# Patient Record
Sex: MALE | Race: BLACK OR AFRICAN AMERICAN | NOT HISPANIC OR LATINO | Employment: UNEMPLOYED | URBAN - METROPOLITAN AREA
[De-identification: names, ages, dates, MRNs, and addresses within clinical notes are randomized per-mention and may not be internally consistent; named-entity substitution may affect disease eponyms.]

---

## 2019-03-02 ENCOUNTER — HOSPITAL ENCOUNTER (EMERGENCY)
Facility: MEDICAL CENTER | Age: 49
End: 2019-03-02
Attending: EMERGENCY MEDICINE
Payer: COMMERCIAL

## 2019-03-02 VITALS
RESPIRATION RATE: 16 BRPM | HEART RATE: 81 BPM | HEIGHT: 68 IN | DIASTOLIC BLOOD PRESSURE: 74 MMHG | SYSTOLIC BLOOD PRESSURE: 115 MMHG | TEMPERATURE: 97.4 F | OXYGEN SATURATION: 100 % | BODY MASS INDEX: 22.55 KG/M2 | WEIGHT: 148.81 LBS

## 2019-03-02 DIAGNOSIS — I10 HYPERTENSION, UNSPECIFIED TYPE: ICD-10-CM

## 2019-03-02 DIAGNOSIS — R51.9 CHRONIC NONINTRACTABLE HEADACHE, UNSPECIFIED HEADACHE TYPE: ICD-10-CM

## 2019-03-02 DIAGNOSIS — Z76.0 MEDICATION REFILL: ICD-10-CM

## 2019-03-02 DIAGNOSIS — G89.29 CHRONIC NONINTRACTABLE HEADACHE, UNSPECIFIED HEADACHE TYPE: ICD-10-CM

## 2019-03-02 PROCEDURE — 99284 EMERGENCY DEPT VISIT MOD MDM: CPT

## 2019-03-02 PROCEDURE — 96372 THER/PROPH/DIAG INJ SC/IM: CPT

## 2019-03-02 PROCEDURE — A9270 NON-COVERED ITEM OR SERVICE: HCPCS | Performed by: EMERGENCY MEDICINE

## 2019-03-02 PROCEDURE — 700102 HCHG RX REV CODE 250 W/ 637 OVERRIDE(OP): Performed by: EMERGENCY MEDICINE

## 2019-03-02 PROCEDURE — 700111 HCHG RX REV CODE 636 W/ 250 OVERRIDE (IP)

## 2019-03-02 PROCEDURE — 700111 HCHG RX REV CODE 636 W/ 250 OVERRIDE (IP): Performed by: EMERGENCY MEDICINE

## 2019-03-02 RX ORDER — DIPHENHYDRAMINE HYDROCHLORIDE 50 MG/ML
INJECTION INTRAMUSCULAR; INTRAVENOUS
Status: COMPLETED
Start: 2019-03-02 | End: 2019-03-02

## 2019-03-02 RX ORDER — AMLODIPINE BESYLATE 5 MG/1
5 TABLET ORAL DAILY
COMMUNITY
End: 2019-03-02

## 2019-03-02 RX ORDER — METOCLOPRAMIDE HYDROCHLORIDE 5 MG/ML
10 INJECTION INTRAMUSCULAR; INTRAVENOUS ONCE
Status: COMPLETED | OUTPATIENT
Start: 2019-03-02 | End: 2019-03-02

## 2019-03-02 RX ORDER — DIPHENHYDRAMINE HYDROCHLORIDE 50 MG/ML
50 INJECTION INTRAMUSCULAR; INTRAVENOUS ONCE
Status: COMPLETED | OUTPATIENT
Start: 2019-03-02 | End: 2019-03-02

## 2019-03-02 RX ORDER — AMLODIPINE BESYLATE 5 MG/1
5 TABLET ORAL DAILY
Qty: 30 TAB | Refills: 0 | Status: SHIPPED | OUTPATIENT
Start: 2019-03-02 | End: 2019-04-01

## 2019-03-02 RX ORDER — IBUPROFEN 200 MG
400 TABLET ORAL ONCE
Status: COMPLETED | OUTPATIENT
Start: 2019-03-02 | End: 2019-03-02

## 2019-03-02 RX ADMIN — DIPHENHYDRAMINE HYDROCHLORIDE 50 MG: 50 INJECTION INTRAMUSCULAR; INTRAVENOUS at 18:42

## 2019-03-02 RX ADMIN — METOCLOPRAMIDE 10 MG: 5 INJECTION, SOLUTION INTRAMUSCULAR; INTRAVENOUS at 18:42

## 2019-03-02 RX ADMIN — IBUPROFEN 400 MG: 200 TABLET, FILM COATED ORAL at 18:41

## 2019-03-02 ASSESSMENT — ENCOUNTER SYMPTOMS
SEIZURES: 0
VOMITING: 0
BACK PAIN: 0
SENSORY CHANGE: 0
TREMORS: 0
NAUSEA: 0
COUGH: 0
FEVER: 0
NECK PAIN: 0
EYE PAIN: 0
DIZZINESS: 0
SPEECH CHANGE: 0
ABDOMINAL PAIN: 0
SORE THROAT: 0
CHILLS: 0
FOCAL WEAKNESS: 0
HEADACHES: 1
LOSS OF CONSCIOUSNESS: 0
EYE REDNESS: 0
SHORTNESS OF BREATH: 0
SINUS PAIN: 0

## 2019-03-02 ASSESSMENT — LIFESTYLE VARIABLES: SUBSTANCE_ABUSE: 0

## 2019-03-03 NOTE — ED NOTES
The patient has been provided with discharge education and information.  The patient was also provided with instructions on follow up care and return precautions.  The patient verbalizes understanding of discharge instructions, follow up care, and return precautons.  All questions have been answered.  Amlodipine RX written by Eliana SHEA, good color and appropriate at time of discharge.  Patient ambulated out of department.

## 2019-03-03 NOTE — ED PROVIDER NOTES
ED Provider Note   3/2/2019  6:23 PM    Means of Arrival: Walk In  History obtained by: patient  Limitations: none    CHIEF COMPLAINT  Chief Complaint   Patient presents with   • Headache     Started a month ago   • Medication Refill     Patient has been out of his amlodipine for the last month.        HPI  Dorian Aden is a 49 y.o. male with history of hypertension presenting with concerns of a frontal headache for 1 month.  He says that approximately 1 month ago he ran out of the pain.  Around that time he has noticed a dull sometimes throbbing headache right front part of his head.  He takes aspirin which provides temporary relief.  No visual changes.  No weakness.  No neurologic symptoms besides headache.  He has had no fevers.    REVIEW OF SYSTEMS  Review of Systems   Constitutional: Negative for chills, fever and malaise/fatigue.   HENT: Negative for congestion, sinus pain and sore throat.    Eyes: Negative for pain and redness.   Respiratory: Negative for cough and shortness of breath.    Gastrointestinal: Negative for abdominal pain, nausea and vomiting.   Musculoskeletal: Negative for back pain and neck pain.   Neurological: Positive for headaches. Negative for dizziness, tremors, sensory change, speech change, focal weakness, seizures and loss of consciousness.   Psychiatric/Behavioral: Negative for substance abuse.   All other systems reviewed and are negative.    See HPI for further details.     PAST MEDICAL HISTORY   has a past medical history of Hypertension.    SOCIAL HISTORY  Social History     Social History Main Topics   • Smoking status: Never Smoker   • Smokeless tobacco: Never Used   • Alcohol use Yes      Comment: occ   • Drug use: No   • Sexual activity: Not on file       SURGICAL HISTORY  patient denies any surgical history    CURRENT MEDICATIONS  Home Medications     Reviewed by Sugey Bustos R.N. (Registered Nurse) on 03/02/19 at 1755  Med List Status: Complete   Medication Last  "Dose Status   amLODIPine (NORVASC) 5 MG Tab >than a month Active                ALLERGIES  No Known Allergies    PHYSICAL EXAM  VITAL SIGNS: /74   Pulse 81   Temp 36.3 °C (97.4 °F) (Temporal)   Resp 16   Ht 1.727 m (5' 8\")   Wt 67.5 kg (148 lb 13 oz)   SpO2 100%   BMI 22.63 kg/m²    Pulse ox interpretation: I interpret this pulse ox as normal.  Constitutional: Alert in no apparent distress.  HENT: Normocephalic, Atraumatic, Bilateral external ears normal. Nose normal.   Eyes: Pupils are equal. Conjunctiva normal, non-icteric.   Heart: Regular rate and rhythm  Lungs: No respiratory distress, regular respirations.   Abdomen: Normal appearance, nondistended,  Skin: Warm, Dry, No erythema, No rash.   Neurologic: Alert, Grossly non-focal. No slurred speech.  No facial droop.  Normal extraocular movements.  Negative Romberg.  Normal finger to nose testing.  Steady gait.  5 out of 5 strength in all extremities.  MSK: Full range of motion of the neck without pain or exacerbation of headache.  Psychiatric: Affect normal, Judgment normal, Mood normal, Appears appropriate and not intoxicated.   Physical Exam      COURSE & MEDICAL DECISION MAKING  Pertinent Labs & Imaging studies reviewed. (See chart for details)    6:23 PM This is an emergent evaluation of a 49 y.o., male with history of hypertension presenting with concerns of a chronic frontal headache.  Temporary relieved with aspirin.  Currently out of amlodipine.  Blood pressure here is 146/88.  Vitals otherwise normal.  Definitely not hypertensive emergency.  He is not showing any signs of altered mental status, no chest pain, no dyspnea, and no neurologic deficits.  Also low suspicion for intracranial hemorrhage given headache coming and going for the past few weeks.  Also unlikely mass lesion given no neurologic changes.  Cannot rule out aneurysm disease but very low suspicion for rupture.  I discussed possibly getting imaging however no emergent " indications at this time given chronicity of symptoms and normal physical exam.  Plan will be to acutely treat the headache here with Motrin, Reglan, Benadryl.  Also refill amlodipine prescription.  He has wife tell me they are working on finding a primary doctor under their insurance.     7:42   Headache is now resolved.  He wishes to go home.  I have provided him with prescription for amlodipine.      The patient will return for worsening symptoms and is stable at the time of discharge. The patient verbalizes understanding.    FINAL IMPRESSION  1. Hypertension, unspecified type    2. Medication refill    3. Chronic nonintractable headache, unspecified headache type               Electronically signed by: Vance Colon II, 3/2/2019 6:23 PM

## 2019-03-03 NOTE — ED TRIAGE NOTES
Chief Complaint   Patient presents with   • Headache     Started a month ago   • Medication Refill     Patient has been out of his amlodipine for the last month.        Patient ambulatory to triage room with family. Patient states he has been out of his amlodipine for the last month and since then he has been having headache. No complaint of chest pain.     Patient placed out in lobby with family and educated on triage process.

## 2024-03-27 ENCOUNTER — APPOINTMENT (OUTPATIENT)
Dept: URGENT CARE | Facility: CLINIC | Age: 54
End: 2024-03-27
Payer: COMMERCIAL

## 2024-03-27 ENCOUNTER — HOSPITAL ENCOUNTER (EMERGENCY)
Facility: MEDICAL CENTER | Age: 54
End: 2024-03-27
Attending: EMERGENCY MEDICINE
Payer: COMMERCIAL

## 2024-03-27 VITALS
SYSTOLIC BLOOD PRESSURE: 181 MMHG | OXYGEN SATURATION: 97 % | TEMPERATURE: 98.1 F | HEART RATE: 88 BPM | HEIGHT: 64 IN | DIASTOLIC BLOOD PRESSURE: 132 MMHG | RESPIRATION RATE: 17 BRPM | WEIGHT: 155.87 LBS | BODY MASS INDEX: 26.61 KG/M2

## 2024-03-27 DIAGNOSIS — R11.2 NAUSEA VOMITING AND DIARRHEA: ICD-10-CM

## 2024-03-27 DIAGNOSIS — R19.7 NAUSEA VOMITING AND DIARRHEA: ICD-10-CM

## 2024-03-27 PROCEDURE — 99283 EMERGENCY DEPT VISIT LOW MDM: CPT

## 2024-03-27 PROCEDURE — 700111 HCHG RX REV CODE 636 W/ 250 OVERRIDE (IP): Performed by: EMERGENCY MEDICINE

## 2024-03-27 RX ORDER — ONDANSETRON 4 MG/1
4 TABLET, ORALLY DISINTEGRATING ORAL EVERY 8 HOURS PRN
Qty: 5 TABLET | Refills: 0 | Status: SHIPPED | OUTPATIENT
Start: 2024-03-27

## 2024-03-27 RX ORDER — ONDANSETRON 4 MG/1
4 TABLET, ORALLY DISINTEGRATING ORAL ONCE
Status: COMPLETED | OUTPATIENT
Start: 2024-03-27 | End: 2024-03-27

## 2024-03-27 RX ADMIN — ONDANSETRON 4 MG: 4 TABLET, ORALLY DISINTEGRATING ORAL at 10:31

## 2024-03-27 NOTE — ED TRIAGE NOTES
"Chief Complaint   Patient presents with    Flu Like Symptoms     X 2 days. N/V/D, sore throat.         BP (!) 175/124   Pulse 90   Temp 36.8 °C (98.3 °F) (Oral)   Resp 18   Ht 1.626 m (5' 4\")   SpO2 98%   BMI 25.54 kg/m²    "

## 2024-03-27 NOTE — ED PROVIDER NOTES
"  ER Provider Note    Scribed for Jack Orellana M.D. by Darrian Bean. 3/27/2024   10:16 AM    Primary Care Provider: No primary care provider noted    CHIEF COMPLAINT  Chief Complaint   Patient presents with    Flu Like Symptoms     X 2 days. N/V/D, sore throat.      EXTERNAL RECORDS REVIEWED  Outpatient Notes shows that the patient was last seen in the ED in 2019 for hypertension     HPI/ROS  LIMITATION TO HISTORY   Select: : None  OUTSIDE HISTORIAN(S):  None    Dorian Aden is a 54 y.o. male who presents to the ED for evaluation of an acute sore throat onset two days ago. Patient reports that a lot of his coworkers are sick with similar symptoms. He reports that since onset, he has been lying in bed as he has been \"sick to his stomach.\" Today was the first day he was able to get out of bed, prompting his visit today. Currently in the ED, he reports feeling improved as his symptoms have resolved, and that he believes he is ready to return to work tomorrow. Patient had associated nausea, vomiting, diarrhea, abdominal pain, and headache. Denies any fevers. No alleviating or exacerbating factors reported. History of hypertension. No known drug allergies.     PAST MEDICAL HISTORY  Past Medical History:   Diagnosis Date    Hypertension        SURGICAL HISTORY  History reviewed. No pertinent surgical history.    FAMILY HISTORY  History reviewed. No pertinent family history.    SOCIAL HISTORY   reports that he has never smoked. He has never used smokeless tobacco. He reports that he does not currently use alcohol. He reports that he does not use drugs.    CURRENT MEDICATIONS  None noted    ALLERGIES  No Known Allergies     PHYSICAL EXAM  BP (!) 175/124   Pulse 90   Temp 36.8 °C (98.3 °F) (Oral)   Resp 18   Ht 1.626 m (5' 4\")   Wt 70.7 kg (155 lb 13.8 oz)   SpO2 98%   BMI 26.75 kg/m²      Nursing note and vitals reviewed.  Constitutional: Well-developed and well-nourished. No distress.   HENT: Head is " normocephalic and atraumatic. Oropharynx is clear and moist without exudate or erythema.   Eyes: Pupils are equal, round, and reactive to light. Conjunctiva are normal.   Cardiovascular: Normal rate and regular rhythm. No murmur heard. Normal radial pulses.  Pulmonary/Chest: Breath sounds normal. No wheezes or rales.   Abdominal: Soft. Unable to elicit any abdominal tenderness. No distention    Musculoskeletal: Extremities exhibit normal range of motion without edema or tenderness.   Neurological: Awake, alert and oriented to person, place, and time. No focal deficits noted.  Skin: Skin is warm and dry. No rash.   Psychiatric: Normal mood and affect. Appropriate for clinical situation      INITIAL ASSESSMENT AND PLAN    10:16 AM - Patient was seen and evaluated at bedside. Patient presents to the ED for flu like symptoms. After my exam, I discussed with the patient the plan of care, which includes treating the patient with medication for their symptoms. He states that he feels well enough to go back to work tomorrow as his symptoms have resolved, so a work note will be provided. Patient understands and verbalizes agreement to plan of care. They had the opportunity to ask questions. No further questions or concerns at this time. I then informed the patient of my plan for discharge, which includes strict return precautions for any new or worsening symptoms. Patient understands and verbalizes agreement to plan of care. Patient is comfortable going home at this time.      ED Observation Status? No; Patient does not meet criteria for ED Observation.        COURSE AND MEDICAL DECISION MAKING    The patient presents today with nausea, vomiting, and diarrhea the patient has a benign abdominal exam. There is no tenderness to make me concerned for more serious intra-abdominal pathology. The patient was treated with Zofran for nausea. On reassessment the patient was feeling better. The patient continued to have a nonsurgical  abdomen. Overall the patient is improved and will be discharged home with a prescription of Zofran. I feel that this patient is a good outpatient treatment candidate. I recommended that the patient return to the emergency department should they have any abdominal discomfort does not resolve within 24 hours.    HTN/IDDM FOLLOW UP:  The patient has known hypertension and is being followed by their primary care doctor      DISPOSITION AND DISCUSSIONS    I have discussed management of the patient with the following physicians and JIA's:  None    Discussion of management with other QHP or appropriate source(s): None     Escalation of care considered, and ultimately not performed: blood analysis and diagnostic imaging.    Barriers to care at this time, including but not limited to: Patient does not have established PCP.     Decision tools and prescription drugs considered including, but not limited to:  Zofran to help his nausea .    Patient will be discharged home.    FOLLOW UP:  [facility], Emergency Dept  [facility] [facility] [address] [phone]    If symptoms worsen      OUTPATIENT MEDICATIONS:  New Prescriptions    ONDANSETRON (ZOFRAN ODT) 4 MG TABLET DISPERSIBLE    Take 1 Tablet by mouth every 8 hours as needed for Nausea/Vomiting.        FINAL DIAGNOSIS  1. Nausea vomiting and diarrhea         Darrian BORGES (Whitney), am scribing for, and in the presence of, Jack Orellana M.D..    Electronically signed by: Darrian Bean (Whitney), 3/27/2024    Jack BORGES M.D. personally performed the services described in this documentation, as scribed by Darrian Bean in my presence, and it is both accurate and complete.      The note accurately reflects work and decisions made by me.  Jack Orellana M.D.  3/27/2024  4:16 PM

## 2024-04-08 ENCOUNTER — HOSPITAL ENCOUNTER (EMERGENCY)
Facility: MEDICAL CENTER | Age: 54
End: 2024-04-08
Attending: EMERGENCY MEDICINE
Payer: COMMERCIAL

## 2024-04-08 VITALS
SYSTOLIC BLOOD PRESSURE: 160 MMHG | DIASTOLIC BLOOD PRESSURE: 100 MMHG | HEIGHT: 66 IN | RESPIRATION RATE: 16 BRPM | BODY MASS INDEX: 25.3 KG/M2 | OXYGEN SATURATION: 95 % | WEIGHT: 157.41 LBS | HEART RATE: 80 BPM | TEMPERATURE: 96.7 F

## 2024-04-08 DIAGNOSIS — R03.0 ELEVATED BLOOD PRESSURE READING: ICD-10-CM

## 2024-04-08 DIAGNOSIS — H91.90 DECREASED HEARING, UNSPECIFIED LATERALITY: ICD-10-CM

## 2024-04-08 DIAGNOSIS — Z78.9 ALCOHOL USE: ICD-10-CM

## 2024-04-08 PROCEDURE — 99281 EMR DPT VST MAYX REQ PHY/QHP: CPT

## 2024-04-08 NOTE — DISCHARGE INSTRUCTIONS
Please see an audiologist to have your hearing measured which will help determine what kind of hearing loss you have.  This may be due to chronic noise exposure.  You should also see an ear nose throat doctor who will evaluate you in the same office as the audiologist usually.    There is some wax in your ears today but not wax that is blocking the passage of noise to your eardrums.

## 2024-04-08 NOTE — ED TRIAGE NOTES
Chief Complaint   Patient presents with    Hearing Loss     Right sided hearing loss x1 year. Pt states it is just getting worse and worse. Reports ear wax build up for months.       Patient ambulatory to triage for above complaint. Patient A&Ox4, GCS 15, patient speaking in full sentences. Equal and unlabored respirations. Patient educated on triage process and encouraged to notify staff if condition worsens. Appropriate protocols ordered. Patient returned to the lobby in stable condition.

## 2024-04-08 NOTE — ED PROVIDER NOTES
"ED Provider Note    CHIEF COMPLAINT  Chief Complaint   Patient presents with    Hearing Loss     Right sided hearing loss x1 year. Pt states it is just getting worse and worse. Reports ear wax build up for months.        EXTERNAL RECORDS REVIEWED  Other none    HPI/ROS  LIMITATION TO HISTORY   Select: : None    OUTSIDE HISTORIAN(S):  none    Dorian Aden is a 54 y.o. male with history of hypertension who presents for hearing loss.    Patient states he is becoming frustrated and others are becoming frustrated with his hearing loss.  He often asks people to repeat themselves, especially when there are other noises in the area.  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    Patient denies trauma, pain, tinnitus.    Patient has had cerumen impaction in the past.    PAST MEDICAL HISTORY   has a past medical history of Hypertension.    SURGICAL HISTORY  patient denies any surgical history    FAMILY HISTORY  History reviewed. No pertinent family history.    SOCIAL HISTORY  Social History     Tobacco Use    Smoking status: Never    Smokeless tobacco: Never   Substance and Sexual Activity    Alcohol use: Not Currently     Comment: occ    Drug use: No    Sexual activity: Not on file       CURRENT MEDICATIONS  Home Medications       Reviewed by Olivia Cruz R.N. (Registered Nurse) on 04/08/24 at 0953  Med List Status: Partial     Medication Last Dose Status   ondansetron (ZOFRAN ODT) 4 MG TABLET DISPERSIBLE  Active                    ALLERGIES  No Known Allergies    PHYSICAL EXAM  VITAL SIGNS: BP (!) 160/100   Pulse 80   Temp 35.9 °C (96.7 °F) (Temporal)   Resp 16   Ht 1.676 m (5' 6\")   Wt 71.4 kg (157 lb 6.5 oz)   SpO2 95%   BMI 25.41 kg/m²    General:  WDWN male, nontoxic appearing in NAD; A+Ox3; V/S as above; elevated blood pressure  Skin: warm and dry; good color; no rash  HEENT: NCAT; EOMs intact; PERRL; no scleral icterus   Neck: FROM; TMs are partially obscured by cerumen " bilaterally; no obvious air-fluid level or erythema noted  Extremities: AHMADI x 4  Neurologic: CNs III-XII grossly intact; speech clear  Psychiatric: Appropriate affect, normal mood      EKG/LABS  None    RADIOLOGY  None    COURSE & MEDICAL DECISION MAKING    ASSESSMENT, COURSE AND PLAN  Care Narrative: This is a 54-year-old who presents for bilateral hearing loss that began more than 1 year ago.  There is no evidence of acute cerumen impaction that be causing his hearing loss.  I suspect this is due to chronic noise exposure.  I have advised him to follow-up with an audiologist and ENT.  A referral was placed for ENT and a PCP.  He was given return precautions.      FINAL DIAGNOSIS  1. Decreased hearing, unspecified laterality    2. Elevated blood pressure reading    3. Alcohol use        Electronically signed by: Ly De M.D., 4/8/2024 10:06 AM

## 2024-05-03 ENCOUNTER — OFFICE VISIT (OUTPATIENT)
Dept: MEDICAL GROUP | Age: 54
End: 2024-05-03
Attending: EMERGENCY MEDICINE
Payer: COMMERCIAL

## 2024-05-03 VITALS
WEIGHT: 160 LBS | RESPIRATION RATE: 16 BRPM | OXYGEN SATURATION: 99 % | BODY MASS INDEX: 25.71 KG/M2 | HEART RATE: 86 BPM | SYSTOLIC BLOOD PRESSURE: 148 MMHG | HEIGHT: 66 IN | TEMPERATURE: 97 F | DIASTOLIC BLOOD PRESSURE: 100 MMHG

## 2024-05-03 DIAGNOSIS — Z23 NEED FOR VACCINATION: ICD-10-CM

## 2024-05-03 DIAGNOSIS — Z12.12 SCREENING FOR COLORECTAL CANCER: ICD-10-CM

## 2024-05-03 DIAGNOSIS — H61.23 BILATERAL IMPACTED CERUMEN: ICD-10-CM

## 2024-05-03 DIAGNOSIS — Z76.89 ESTABLISHING CARE WITH NEW DOCTOR, ENCOUNTER FOR: ICD-10-CM

## 2024-05-03 DIAGNOSIS — Z12.5 SCREENING FOR PROSTATE CANCER: ICD-10-CM

## 2024-05-03 DIAGNOSIS — Z11.59 NEED FOR HEPATITIS C SCREENING TEST: ICD-10-CM

## 2024-05-03 DIAGNOSIS — Z00.00 BLOOD TESTS FOR ROUTINE GENERAL PHYSICAL EXAMINATION: ICD-10-CM

## 2024-05-03 DIAGNOSIS — Z11.4 SCREENING FOR HIV (HUMAN IMMUNODEFICIENCY VIRUS): ICD-10-CM

## 2024-05-03 DIAGNOSIS — Z12.11 SCREENING FOR COLORECTAL CANCER: ICD-10-CM

## 2024-05-03 DIAGNOSIS — I10 ESSENTIAL HYPERTENSION: ICD-10-CM

## 2024-05-03 PROCEDURE — 90746 HEPB VACCINE 3 DOSE ADULT IM: CPT | Performed by: PHYSICIAN ASSISTANT

## 2024-05-03 PROCEDURE — 90471 IMMUNIZATION ADMIN: CPT | Performed by: PHYSICIAN ASSISTANT

## 2024-05-03 PROCEDURE — 90715 TDAP VACCINE 7 YRS/> IM: CPT | Performed by: PHYSICIAN ASSISTANT

## 2024-05-03 PROCEDURE — 99204 OFFICE O/P NEW MOD 45 MIN: CPT | Mod: 25 | Performed by: PHYSICIAN ASSISTANT

## 2024-05-03 PROCEDURE — 90472 IMMUNIZATION ADMIN EACH ADD: CPT | Performed by: PHYSICIAN ASSISTANT

## 2024-05-03 PROCEDURE — 3077F SYST BP >= 140 MM HG: CPT | Performed by: PHYSICIAN ASSISTANT

## 2024-05-03 PROCEDURE — 3080F DIAST BP >= 90 MM HG: CPT | Performed by: PHYSICIAN ASSISTANT

## 2024-05-03 PROCEDURE — 69210 REMOVE IMPACTED EAR WAX UNI: CPT | Mod: 50 | Performed by: PHYSICIAN ASSISTANT

## 2024-05-03 RX ORDER — AMLODIPINE BESYLATE 5 MG/1
5 TABLET ORAL DAILY
Qty: 30 TABLET | Refills: 2 | Status: SHIPPED | OUTPATIENT
Start: 2024-05-03

## 2024-05-03 ASSESSMENT — PATIENT HEALTH QUESTIONNAIRE - PHQ9: CLINICAL INTERPRETATION OF PHQ2 SCORE: 0

## 2024-06-07 ENCOUNTER — OFFICE VISIT (OUTPATIENT)
Dept: MEDICAL GROUP | Age: 54
End: 2024-06-07
Payer: COMMERCIAL

## 2024-06-07 VITALS
HEIGHT: 66 IN | SYSTOLIC BLOOD PRESSURE: 142 MMHG | DIASTOLIC BLOOD PRESSURE: 80 MMHG | TEMPERATURE: 96.9 F | HEART RATE: 87 BPM | BODY MASS INDEX: 25.39 KG/M2 | OXYGEN SATURATION: 95 % | WEIGHT: 158 LBS

## 2024-06-07 DIAGNOSIS — F41.1 GAD (GENERALIZED ANXIETY DISORDER): ICD-10-CM

## 2024-06-07 DIAGNOSIS — I10 ESSENTIAL HYPERTENSION: ICD-10-CM

## 2024-06-07 DIAGNOSIS — Z23 NEED FOR VACCINATION: ICD-10-CM

## 2024-06-07 PROCEDURE — 3077F SYST BP >= 140 MM HG: CPT | Performed by: PHYSICIAN ASSISTANT

## 2024-06-07 PROCEDURE — 3079F DIAST BP 80-89 MM HG: CPT | Performed by: PHYSICIAN ASSISTANT

## 2024-06-07 PROCEDURE — 90471 IMMUNIZATION ADMIN: CPT | Performed by: PHYSICIAN ASSISTANT

## 2024-06-07 PROCEDURE — 90746 HEPB VACCINE 3 DOSE ADULT IM: CPT | Performed by: PHYSICIAN ASSISTANT

## 2024-06-07 PROCEDURE — 99214 OFFICE O/P EST MOD 30 MIN: CPT | Mod: 25 | Performed by: PHYSICIAN ASSISTANT

## 2024-06-07 RX ORDER — AMLODIPINE BESYLATE 5 MG/1
10 TABLET ORAL DAILY
Qty: 90 TABLET | Refills: 3 | Status: SHIPPED | OUTPATIENT
Start: 2024-06-07 | End: 2024-06-07 | Stop reason: SDUPTHER

## 2024-06-07 RX ORDER — AMLODIPINE BESYLATE 10 MG/1
10 TABLET ORAL DAILY
Qty: 90 TABLET | Refills: 3 | Status: SHIPPED | OUTPATIENT
Start: 2024-06-07

## 2024-06-07 RX ORDER — SERTRALINE HYDROCHLORIDE 25 MG/1
25 TABLET, FILM COATED ORAL DAILY
Qty: 30 TABLET | Refills: 2 | Status: SHIPPED | OUTPATIENT
Start: 2024-06-07

## 2024-06-07 RX ORDER — PROPRANOLOL HYDROCHLORIDE 10 MG/1
10 TABLET ORAL 3 TIMES DAILY PRN
Qty: 30 TABLET | Refills: 1 | Status: SHIPPED | OUTPATIENT
Start: 2024-06-07

## 2024-06-07 NOTE — PROGRESS NOTES
"cc: Follow-up hypertension and anxiety    Subjective:     HPI    History of Present Illness  The patient is a 54-year-old male who presents for for follow-up hypertension.  He was started on 5 mg of amlodipine.  He does not have a blood pressure cuff at home.  His blood pressure still elevated in clinic today.  He does still note intermittent headaches, they are relieved with ibuprofen.  Denies chest pain, palpitations, shortness of breath.    He is also been experiencing a lot of anxiety.  This is been going on for the past few months.  Mostly due to external stressors with his family.  However it is on a day-to-day basis.  He gets to the point where his anxiety feels like it is a 9 out of 10.  He feels incredibly overwhelmed, sometimes even becomes tearful/crying.  He does believe this does attribute to some of his blood pressure issues.  He has never been on medications for anxiety, but would be amicable to trying.  Would be also interested in counseling              Review of systems:  See above.       Current Outpatient Medications:     sertraline (ZOLOFT) 25 MG tablet, Take 1 Tablet by mouth every day., Disp: 30 Tablet, Rfl: 2    propranolol (INDERAL) 10 MG Tab, Take 1 Tablet by mouth 3 times a day as needed (anxiety)., Disp: 30 Tablet, Rfl: 1    amLODIPine (NORVASC) 10 MG Tab, Take 1 Tablet by mouth every day., Disp: 90 Tablet, Rfl: 3    Allergies, past medical history, past surgical history, family history, social history reviewed and updated    Objective:     Vitals: BP (!) 142/80 (BP Location: Left arm, Patient Position: Sitting, BP Cuff Size: Adult)   Pulse 87   Temp 36.1 °C (96.9 °F) (Temporal)   Ht 1.676 m (5' 6\")   Wt 71.7 kg (158 lb)   SpO2 95%   BMI 25.50 kg/m²   General: Alert, pleasant, NAD  HEENT: Normocephalic. Neck supple.No carotid bruits   Heart: Regular rate and rhythm.  S1 and S2 normal.  No murmurs appreciated.  Respiratory: Normal respiratory effort.  Clear to auscultation " bilaterally.  Skin: Warm, dry, no rashes.  Extremities: No leg edema.    Psych:  Affect/mood is normal, judgement is good, memory is intact, grooming is appropriate.    Assessment/Plan:     Dorian was seen today for follow-up.    Diagnoses and all orders for this visit:    CHUCK (generalized anxiety disorder)  -Uncontrolled.  Discussed that he is experiencing moderate/severe anxiety on a day-to-day basis maintenance medication would be beneficial.  He is amicable to starting medications.  Will trial 25 mg of Zoloft.  Advised if after 2 weeks he is tolerating medication well, but not noting much improvement can increase Zoloft to 50 mg.  Medication side effects reviewed.  Referral also placed to counseling.  Given propranolol to use as needed for panic attacks.  -     Referral to Behavioral Health  -     sertraline (ZOLOFT) 25 MG tablet; Take 1 Tablet by mouth every day.  -     propranolol (INDERAL) 10 MG Tab; Take 1 Tablet by mouth 3 times a day as needed (anxiety).    Essential hypertension  Uncontrolled.  Blood pressure is elevated in clinic today.  Increase amlodipine to 10 mg.  Advised to obtain blood pressure cuff at home.  Monitor BP  -     amLODIPine (NORVASC) 10 MG Tab; Take 1 Tablet by mouth every day.    Need for vaccination  -Immunization given in clinic today  -     Hepatitis B Vaccine Adult 20+        Return in about 4 weeks (around 7/5/2024) for CHUCK, Medication Check.